# Patient Record
Sex: FEMALE | NOT HISPANIC OR LATINO | Employment: FULL TIME | URBAN - NONMETROPOLITAN AREA
[De-identification: names, ages, dates, MRNs, and addresses within clinical notes are randomized per-mention and may not be internally consistent; named-entity substitution may affect disease eponyms.]

---

## 2017-10-14 ENCOUNTER — HOSPITAL ENCOUNTER (EMERGENCY)
Facility: HOSPITAL | Age: 27
Discharge: HOME | End: 2017-10-14
Attending: EMERGENCY MEDICINE | Admitting: EMERGENCY MEDICINE

## 2017-10-14 VITALS
HEIGHT: 66 IN | DIASTOLIC BLOOD PRESSURE: 88 MMHG | SYSTOLIC BLOOD PRESSURE: 130 MMHG | RESPIRATION RATE: 18 BRPM | BODY MASS INDEX: 33.75 KG/M2 | WEIGHT: 210 LBS | HEART RATE: 67 BPM | OXYGEN SATURATION: 96 % | TEMPERATURE: 97.7 F

## 2017-10-14 DIAGNOSIS — M79.10 PAIN IN THE MUSCLES: Primary | ICD-10-CM

## 2017-10-14 DIAGNOSIS — R11.2 NAUSEA AND VOMITING, INTRACTABILITY OF VOMITING NOT SPECIFIED, UNSPECIFIED VOMITING TYPE: ICD-10-CM

## 2017-10-14 DIAGNOSIS — T67.5XXA HEAT EXHAUSTION, INITIAL ENCOUNTER: ICD-10-CM

## 2017-10-14 LAB
ALBUMIN SERPL-MCNC: 4.7 G/DL (ref 3.5–5)
ALBUMIN/GLOB SERPL: 1.5 G/DL (ref 1–2)
ALP SERPL-CCNC: 79 U/L (ref 38–126)
ALT SERPL W P-5'-P-CCNC: 30 U/L (ref 13–69)
ANION GAP SERPL CALCULATED.3IONS-SCNC: 16.4 MMOL/L
AST SERPL-CCNC: 29 U/L (ref 15–46)
B-HCG UR QL: NEGATIVE
BACTERIA UR QL AUTO: ABNORMAL /HPF
BASOPHILS # BLD AUTO: 0.06 10*3/MM3 (ref 0–0.2)
BASOPHILS NFR BLD AUTO: 0.7 % (ref 0–2.5)
BILIRUB SERPL-MCNC: 0.5 MG/DL (ref 0.2–1.3)
BILIRUB UR QL STRIP: NEGATIVE
BUN BLD-MCNC: 15 MG/DL (ref 7–20)
BUN/CREAT SERPL: 16.7 (ref 7.1–23.5)
CALCIUM SPEC-SCNC: 9.9 MG/DL (ref 8.4–10.2)
CHLORIDE SERPL-SCNC: 107 MMOL/L (ref 98–107)
CK SERPL-CCNC: 218 U/L (ref 30–170)
CLARITY UR: ABNORMAL
CO2 SERPL-SCNC: 25 MMOL/L (ref 26–30)
COLOR UR: YELLOW
CREAT BLD-MCNC: 0.9 MG/DL (ref 0.6–1.3)
CYSTINE CRY URNS QL MICRO: ABNORMAL /HPF
DEPRECATED RDW RBC AUTO: 37.8 FL (ref 37–54)
EOSINOPHIL # BLD AUTO: 0.09 10*3/MM3 (ref 0–0.7)
EOSINOPHIL NFR BLD AUTO: 1.1 % (ref 0–7)
ERYTHROCYTE [DISTWIDTH] IN BLOOD BY AUTOMATED COUNT: 11.7 % (ref 11.5–14.5)
FLUAV AG NPH QL: NEGATIVE
FLUBV AG NPH QL IA: NEGATIVE
GFR SERPL CREATININE-BSD FRML MDRD: 75 ML/MIN/1.73
GLOBULIN UR ELPH-MCNC: 3.2 GM/DL
GLUCOSE BLD-MCNC: 88 MG/DL (ref 74–98)
GLUCOSE UR STRIP-MCNC: NEGATIVE MG/DL
HCT VFR BLD AUTO: 41.9 % (ref 37–47)
HGB BLD-MCNC: 14.7 G/DL (ref 12–16)
HGB UR QL STRIP.AUTO: ABNORMAL
HOLD SPECIMEN: NORMAL
HYALINE CASTS UR QL AUTO: ABNORMAL /LPF
IMM GRANULOCYTES # BLD: 0.02 10*3/MM3 (ref 0–0.06)
IMM GRANULOCYTES NFR BLD: 0.2 % (ref 0–0.6)
KETONES UR QL STRIP: NEGATIVE
LEUKOCYTE ESTERASE UR QL STRIP.AUTO: NEGATIVE
LYMPHOCYTES # BLD AUTO: 2.87 10*3/MM3 (ref 0.6–3.4)
LYMPHOCYTES NFR BLD AUTO: 35.1 % (ref 10–50)
MCH RBC QN AUTO: 31.5 PG (ref 27–31)
MCHC RBC AUTO-ENTMCNC: 35.1 G/DL (ref 30–37)
MCV RBC AUTO: 89.7 FL (ref 81–99)
MONOCYTES # BLD AUTO: 0.75 10*3/MM3 (ref 0–0.9)
MONOCYTES NFR BLD AUTO: 9.2 % (ref 0–12)
MYOGLOBIN SERPL-MCNC: 58.8 NG/ML (ref 0–101)
NEUTROPHILS # BLD AUTO: 4.38 10*3/MM3 (ref 2–6.9)
NEUTROPHILS NFR BLD AUTO: 53.7 % (ref 37–80)
NITRITE UR QL STRIP: NEGATIVE
NRBC BLD MANUAL-RTO: 0 /100 WBC (ref 0–0)
PH UR STRIP.AUTO: 6 [PH] (ref 5–8)
PLATELET # BLD AUTO: 251 10*3/MM3 (ref 130–400)
PMV BLD AUTO: 9.6 FL (ref 6–12)
POTASSIUM BLD-SCNC: 4.4 MMOL/L (ref 3.5–5.1)
PROT SERPL-MCNC: 7.9 G/DL (ref 6.3–8.2)
PROT UR QL STRIP: NEGATIVE
RBC # BLD AUTO: 4.67 10*6/MM3 (ref 4.2–5.4)
RBC # UR: ABNORMAL /HPF
REF LAB TEST METHOD: ABNORMAL
S PYO AG THROAT QL: NEGATIVE
SODIUM BLD-SCNC: 144 MMOL/L (ref 137–145)
SP GR UR STRIP: >=1.03 (ref 1–1.03)
SQUAMOUS #/AREA URNS HPF: ABNORMAL /HPF
UROBILINOGEN UR QL STRIP: ABNORMAL
WBC NRBC COR # BLD: 8.17 10*3/MM3 (ref 4.8–10.8)
WBC UR QL AUTO: ABNORMAL /HPF

## 2017-10-14 PROCEDURE — 87880 STREP A ASSAY W/OPTIC: CPT | Performed by: PHYSICIAN ASSISTANT

## 2017-10-14 PROCEDURE — 87804 INFLUENZA ASSAY W/OPTIC: CPT | Performed by: PHYSICIAN ASSISTANT

## 2017-10-14 PROCEDURE — 96376 TX/PRO/DX INJ SAME DRUG ADON: CPT

## 2017-10-14 PROCEDURE — 87086 URINE CULTURE/COLONY COUNT: CPT | Performed by: PHYSICIAN ASSISTANT

## 2017-10-14 PROCEDURE — 81001 URINALYSIS AUTO W/SCOPE: CPT | Performed by: PHYSICIAN ASSISTANT

## 2017-10-14 PROCEDURE — 25010000002 ONDANSETRON PER 1 MG: Performed by: PHYSICIAN ASSISTANT

## 2017-10-14 PROCEDURE — 82550 ASSAY OF CK (CPK): CPT | Performed by: PHYSICIAN ASSISTANT

## 2017-10-14 PROCEDURE — 25010000002 KETOROLAC TROMETHAMINE PER 15 MG: Performed by: PHYSICIAN ASSISTANT

## 2017-10-14 PROCEDURE — 99283 EMERGENCY DEPT VISIT LOW MDM: CPT

## 2017-10-14 PROCEDURE — 83874 ASSAY OF MYOGLOBIN: CPT | Performed by: PHYSICIAN ASSISTANT

## 2017-10-14 PROCEDURE — 96361 HYDRATE IV INFUSION ADD-ON: CPT

## 2017-10-14 PROCEDURE — 87081 CULTURE SCREEN ONLY: CPT | Performed by: PHYSICIAN ASSISTANT

## 2017-10-14 PROCEDURE — 96375 TX/PRO/DX INJ NEW DRUG ADDON: CPT

## 2017-10-14 PROCEDURE — 85025 COMPLETE CBC W/AUTO DIFF WBC: CPT | Performed by: PHYSICIAN ASSISTANT

## 2017-10-14 PROCEDURE — 96374 THER/PROPH/DIAG INJ IV PUSH: CPT

## 2017-10-14 PROCEDURE — 80053 COMPREHEN METABOLIC PANEL: CPT | Performed by: PHYSICIAN ASSISTANT

## 2017-10-14 PROCEDURE — 81025 URINE PREGNANCY TEST: CPT | Performed by: PHYSICIAN ASSISTANT

## 2017-10-14 RX ORDER — SODIUM CHLORIDE 0.9 % (FLUSH) 0.9 %
10 SYRINGE (ML) INJECTION AS NEEDED
Status: DISCONTINUED | OUTPATIENT
Start: 2017-10-14 | End: 2017-10-15 | Stop reason: HOSPADM

## 2017-10-14 RX ORDER — ONDANSETRON 2 MG/ML
4 INJECTION INTRAMUSCULAR; INTRAVENOUS ONCE
Status: COMPLETED | OUTPATIENT
Start: 2017-10-14 | End: 2017-10-14

## 2017-10-14 RX ORDER — KETOROLAC TROMETHAMINE 30 MG/ML
30 INJECTION, SOLUTION INTRAMUSCULAR; INTRAVENOUS ONCE
Status: COMPLETED | OUTPATIENT
Start: 2017-10-14 | End: 2017-10-14

## 2017-10-14 RX ORDER — ONDANSETRON 4 MG/1
4 TABLET, ORALLY DISINTEGRATING ORAL EVERY 6 HOURS PRN
Qty: 10 TABLET | Refills: 0 | Status: SHIPPED | OUTPATIENT
Start: 2017-10-14 | End: 2018-02-05

## 2017-10-14 RX ORDER — NAPROXEN 500 MG/1
500 TABLET ORAL 2 TIMES DAILY PRN
Qty: 10 TABLET | Refills: 0 | Status: SHIPPED | OUTPATIENT
Start: 2017-10-14 | End: 2018-02-05 | Stop reason: ALTCHOICE

## 2017-10-14 RX ADMIN — ONDANSETRON 4 MG: 2 INJECTION INTRAMUSCULAR; INTRAVENOUS at 21:19

## 2017-10-14 RX ADMIN — KETOROLAC TROMETHAMINE 30 MG: 30 INJECTION, SOLUTION INTRAMUSCULAR at 22:46

## 2017-10-14 RX ADMIN — SODIUM CHLORIDE 1000 ML: 9 INJECTION, SOLUTION INTRAVENOUS at 21:19

## 2017-10-14 RX ADMIN — ONDANSETRON 4 MG: 2 INJECTION INTRAMUSCULAR; INTRAVENOUS at 22:45

## 2017-10-15 NOTE — ED PROVIDER NOTES
Subjective   HPI Comments: 27-year-old female just returned from a camping trip.  She was in the heat for quite some time.  Denies drinking [place] water or creek water.  She is here with a one-day history of neck and back muscle cramps that are fairly severe associated with dizziness, vomiting ×2 numbness and tingling of her face.  Also states she is somewhat disoriented and confused at times.  No URI symptoms, sore throat, earache, cough, diarrhea, urinary complaint or fever.  No abdominal pain.  LMP 6 weeks ago.  Denies pregnancy.  She is always irregular.  She denies overexertion or working out recently.  No tick bites or other type of insect-type bite.  No chest pain.    Aggravating factors: None.  Alleviating factors: None.  Treatment prior to arrival: None.      History provided by:  Patient  History limited by: nothing.   used: No        Review of Systems   Constitutional: Negative.    HENT: Negative.    Eyes: Negative.    Respiratory: Negative.    Cardiovascular: Negative.  Negative for chest pain.   Gastrointestinal: Positive for nausea and vomiting. Negative for abdominal pain.   Endocrine: Negative.    Genitourinary: Negative for dysuria.   Musculoskeletal: Positive for back pain and neck pain.   Skin: Negative.    Allergic/Immunologic: Negative.    Neurological: Positive for dizziness, light-headedness and numbness.   Hematological: Negative.    Psychiatric/Behavioral: Negative.    All other systems reviewed and are negative.      History reviewed. No pertinent past medical history.    No Known Allergies    Past Surgical History:   Procedure Laterality Date   • CHOLECYSTECTOMY     • KNEE SURGERY         History reviewed. No pertinent family history.    Social History     Social History   • Marital status: Single     Spouse name: N/A   • Number of children: N/A   • Years of education: N/A     Social History Main Topics   • Smoking status: Never Smoker   • Smokeless tobacco: None   •  Alcohol use Yes      Comment: occ   • Drug use: No   • Sexual activity: Not Asked     Other Topics Concern   • None     Social History Narrative   • None           Objective   Physical Exam   Constitutional: She is oriented to person, place, and time. She appears well-developed and well-nourished. No distress.   HENT:   Head: Normocephalic and atraumatic.   Right Ear: External ear normal.   Left Ear: External ear normal.   Tympanic membranes and pharynx are normal.   Eyes: EOM are normal. Pupils are equal, round, and reactive to light.   Neck: Normal range of motion. Neck supple.   Cardiovascular: Normal rate, regular rhythm and normal heart sounds.    Pulmonary/Chest: Effort normal and breath sounds normal. No stridor. She has no wheezes. She exhibits no tenderness.   Abdominal: Soft. She exhibits no distension. There is no tenderness. There is no rebound and no guarding.   Musculoskeletal: Normal range of motion. She exhibits no edema.   Neurological: She is alert and oriented to person, place, and time.   Skin: Skin is warm and dry. No rash noted. She is not diaphoretic.   Psychiatric: She has a normal mood and affect. Her behavior is normal. Judgment and thought content normal.   Nursing note and vitals reviewed.      Procedures         ED Course  ED Course   Comment By Time   Patient still with nausea.  Muscle aches are much better.  Laboratory evaluation noted above.  Mild elevation of her CK.  Story suggests heat exhaustion with minimal muscle breakdown.  I have explained to her to stay out of the sun for 2 days, drink lots of fluids and we will treat for muscle aches and nausea. Jakob Younger PA-C 10/14 2214                  [facility]  Number of Diagnoses or Management Options  Heat exhaustion, initial encounter: new and requires workup  Nausea and vomiting, intractability of vomiting not specified, unspecified vomiting type: new and requires workup  Pain in the muscles: new and requires workup     Amount and/or  Complexity of Data Reviewed  Clinical lab tests: reviewed and ordered  Discuss the patient with other providers: yes    Risk of Complications, Morbidity, and/or Mortality  Presenting problems: moderate  Diagnostic procedures: low  Management options: moderate    Patient Progress  Patient progress: stable      Final diagnoses:   Pain in the muscles   Heat exhaustion, initial encounter   Nausea and vomiting, intractability of vomiting not specified, unspecified vomiting type            Jakob Younger PA-C  10/14/17 1995

## 2017-10-15 NOTE — DISCHARGE INSTRUCTIONS
Stay out of the sun for at least 2 days.  Increase fluids at home.  Keep on drinking fluids until you urinate.  Return to the ER for severe abdominal pain, intractable vomiting, chest pain, shortness of breath, new or worsening symptoms.  Follow-up with your doctor in 2 days.  If you do not have a doctor, follow-up with the [facility].  Call for appointment.

## 2017-10-16 LAB
BACTERIA SPEC AEROBE CULT: NORMAL
BACTERIA SPEC AEROBE CULT: NORMAL

## 2018-02-05 ENCOUNTER — OFFICE VISIT (OUTPATIENT)
Dept: RETAIL CLINIC | Facility: CLINIC | Age: 28
End: 2018-02-05

## 2018-02-05 VITALS
OXYGEN SATURATION: 98 % | SYSTOLIC BLOOD PRESSURE: 130 MMHG | DIASTOLIC BLOOD PRESSURE: 80 MMHG | RESPIRATION RATE: 18 BRPM | HEART RATE: 68 BPM | TEMPERATURE: 97.8 F

## 2018-02-05 DIAGNOSIS — H10.31 ACUTE CONJUNCTIVITIS OF RIGHT EYE, UNSPECIFIED ACUTE CONJUNCTIVITIS TYPE: Primary | ICD-10-CM

## 2018-02-05 PROCEDURE — 99213 OFFICE O/P EST LOW 20 MIN: CPT | Performed by: NURSE PRACTITIONER

## 2018-02-05 RX ORDER — POLYMYXIN B SULFATE AND TRIMETHOPRIM 1; 10000 MG/ML; [USP'U]/ML
1 SOLUTION OPHTHALMIC EVERY 6 HOURS
Qty: 10 ML | Refills: 0 | Status: SHIPPED | OUTPATIENT
Start: 2018-02-05 | End: 2018-02-12

## 2018-02-05 RX ORDER — ACETAMINOPHEN 325 MG/1
650 TABLET ORAL EVERY 6 HOURS PRN
COMMUNITY
End: 2018-04-27

## 2018-02-05 NOTE — PATIENT INSTRUCTIONS
Allergic Conjunctivitis  A clear membrane (conjunctiva) covers the white part of your eye and the inner surface of your eyelid. Allergic conjunctivitis happens when this membrane has inflammation. This is caused by allergies. Common causes of allergic reactions (allergens)include:  · Outdoor allergens, such as:  ¨ Pollen.  ¨ Grass and weeds.  ¨ Mold spores.  · Indoor allergens, such as:  ¨ Dust.  ¨ Smoke.  ¨ Mold.  ¨ Pet dander.  ¨ Animal hair.  This condition can make your eye red or pink. It can also make your eye feel itchy. This condition cannot be spread from one person to another person (is not contagious).  Follow these instructions at home:  · Try not to be around things that you are allergic to.  · Take or apply over-the-counter and prescription medicines only as told by your doctor. These include any eye drops.  · Place a cool, clean washcloth on your eye for 10-20 minutes. Do this 3-4 times a day.  · Do not touch or rub your eyes.  · Do not wear contact lenses until the inflammation is gone. Wear glasses instead.  · Do not wear eye makeup until the inflammation is gone.  · Keep all follow-up visits as told by your doctor. This is important.  Contact a doctor if:  · Your symptoms get worse.  · Your symptoms do not get better with treatment.  · You have mild eye pain.  · You are sensitive to light,  · You have spots or blisters on your eyes.  · You have pus coming from your eye.  · You have a fever.  Get help right away if:  · You have redness, swelling, or other symptoms in only one eye.  · Your vision is blurry.  · You have vision changes.  · You have very bad eye pain.  Summary  · Allergic conjunctivitis is caused by allergies. It can make your eye red or pink, and it can make your eye feel itchy.  · This condition cannot be spread from one person to another person (is not contagious).  · Try not to be around things that you are allergic to.  · Take or apply over-the-counter and prescription medicines  only as told by your doctor. These include any eye drops.  · Contact your doctor if your symptoms get worse or they do not get better with treatment.  This information is not intended to replace advice given to you by your health care provider. Make sure you discuss any questions you have with your health care provider.  Document Released: 06/07/2011 Document Revised: 08/11/2017 Document Reviewed: 08/11/2017  [employer] Patient Education © 2017 [employer]    Eye drops as prescribed.  Strict hand hygiene.  Follow up with eye doctor if no improvement or worsening.

## 2018-02-05 NOTE — PROGRESS NOTES
Subjective   Patient ID: Arelis Taylor is a 27 y.o. female presents with   Chief Complaint   Patient presents with   • Conjunctivitis     Red eye       Conjunctivitis    The current episode started today. The problem has been gradually worsening. The problem is moderate. Nothing relieves the symptoms. Nothing aggravates the symptoms. Associated symptoms include eye itching, congestion, eye discharge and eye redness. Pertinent negatives include no fever, no decreased vision, no double vision, no photophobia, no ear pain, no headaches, no rhinorrhea, no sore throat, no swollen glands, no muscle aches, no neck stiffness, no cough, no wheezing, no rash and no eye pain (burning sensation).       No Known Allergies    The following portions of the patient's history were reviewed and updated as appropriate: allergies, current medications, past family history, past medical history, past social history, past surgical history and problem list.      Review of Systems   Constitutional: Negative for fever.   HENT: Positive for congestion. Negative for ear pain, rhinorrhea, sinus pressure, sneezing and sore throat.    Eyes: Positive for discharge, redness and itching. Negative for double vision, photophobia, pain (burning sensation) and visual disturbance.   Respiratory: Negative for cough, shortness of breath and wheezing.    Skin: Negative for rash.   Neurological: Negative for headaches.       Objective     Vitals:    02/05/18 1108   BP: 130/80   Pulse: 68   Resp: 18   Temp: 97.8 °F (36.6 °C)   SpO2: 98%         Physical Exam   Constitutional: She is oriented to person, place, and time. She appears well-developed and well-nourished. She does not appear ill. No distress.   HENT:   Head: Normocephalic.   Right Ear: Hearing, tympanic membrane, external ear and ear canal normal.   Left Ear: Hearing, tympanic membrane, external ear and ear canal normal.   Nose: Nose normal. No rhinorrhea or sinus tenderness.    Mouth/Throat: Oropharynx is clear and moist and mucous membranes are normal. Tonsils are 2+ on the right. Tonsils are 2+ on the left. No tonsillar exudate.   Eyes: EOM and lids are normal. Pupils are equal, round, and reactive to light. Right conjunctiva is injected.   Sclera white.   Neck: No tracheal deviation present.   Cardiovascular: Normal rate, regular rhythm, S1 normal, S2 normal and normal heart sounds.    Pulmonary/Chest: Effort normal and breath sounds normal. No accessory muscle usage. No respiratory distress.   Lymphadenopathy:     She has no cervical adenopathy.   Neurological: She is alert and oriented to person, place, and time.   Skin: Skin is warm and dry.   Vitals reviewed.        Arelis was seen today for conjunctivitis.    Diagnoses and all orders for this visit:    Acute conjunctivitis of right eye, unspecified acute conjunctivitis type  -     trimethoprim-polymyxin b (POLYTRIM) 96552-5.1 UNIT/ML-% ophthalmic solution; Administer 1 drop to the right eye Every 6 (Six) Hours for 7 days.      Eye drops as prescribed.  Strict hand hygiene.  Follow up with eye doctor if no improvement or worsening.  Follow-up with Primary Care Physician in 48-72 hours if these symptoms worsen or fail to improve as anticipated. Patient verbalizes understanding.

## 2018-04-27 ENCOUNTER — OFFICE VISIT (OUTPATIENT)
Dept: SURGERY | Facility: CLINIC | Age: 28
End: 2018-04-27

## 2018-04-27 VITALS
WEIGHT: 205.4 LBS | TEMPERATURE: 98 F | OXYGEN SATURATION: 98 % | BODY MASS INDEX: 33.01 KG/M2 | HEART RATE: 86 BPM | SYSTOLIC BLOOD PRESSURE: 120 MMHG | HEIGHT: 66 IN | DIASTOLIC BLOOD PRESSURE: 82 MMHG

## 2018-04-27 DIAGNOSIS — K59.00 CONSTIPATION, UNSPECIFIED CONSTIPATION TYPE: ICD-10-CM

## 2018-04-27 DIAGNOSIS — R10.13 EPIGASTRIC ABDOMINAL PAIN: Primary | ICD-10-CM

## 2018-04-27 PROCEDURE — 99244 OFF/OP CNSLTJ NEW/EST MOD 40: CPT | Performed by: SURGERY

## 2018-04-27 RX ORDER — ONDANSETRON 4 MG/1
4 TABLET, FILM COATED ORAL EVERY 8 HOURS PRN
COMMUNITY

## 2018-04-27 RX ORDER — OMEPRAZOLE 20 MG/1
20 CAPSULE, DELAYED RELEASE ORAL DAILY
COMMUNITY
End: 2018-05-25 | Stop reason: ALTCHOICE

## 2018-04-27 NOTE — PROGRESS NOTES
Patient: Arelis Taylor    YOB: 1990    Date: 04/27/2018    Primary Care Provider: DONALD Liu    Reason for Consultation: EGD    Chief Complaint   Patient presents with   • Abdominal Pain       Subjective .     History of present illness:  I saw the patient in the office today as a consultation for evaluation and treatment of abdominal pain. She complains of constant epigastric ache, turns sharp after meals, does not radiate for the past 3 months. She states prior to taking omeprazole then pain was severe.The Prilosec has helped somewhat certainly has relieved any GERD that she has had.  She complains of associated nausea and constipation. She denies vomiting or diarrhea. She has had a cholecystectomy in 2014.    The following portions of the patient's history were reviewed and updated as appropriate: allergies, current medications, past family history, past medical history, past social history, past surgical history and problem list.      Review of Systems   Constitutional: Negative for chills, fever and unexpected weight change.   HENT: Negative for hearing loss, trouble swallowing and voice change.    Eyes: Negative for visual disturbance.   Respiratory: Negative for apnea, cough, chest tightness, shortness of breath and wheezing.    Cardiovascular: Negative for chest pain, palpitations and leg swelling.   Gastrointestinal: Positive for abdominal pain, diarrhea and nausea. Negative for abdominal distention, anal bleeding, blood in stool, constipation, rectal pain and vomiting.   Endocrine: Negative for cold intolerance and heat intolerance.   Genitourinary: Negative for difficulty urinating, dysuria and flank pain.   Musculoskeletal: Negative for back pain and gait problem.   Skin: Negative for color change, rash and wound.   Neurological: Negative for dizziness, syncope, speech difficulty, weakness, light-headedness, numbness and headaches.   Hematological: Negative for  adenopathy. Does not bruise/bleed easily.   Psychiatric/Behavioral: Negative for confusion. The patient is not nervous/anxious.        History:  History reviewed. No pertinent past medical history.    Past Surgical History:   Procedure Laterality Date   • CHOLECYSTECTOMY     • KNEE ARTHROSCOPY W/ ACL RECONSTRUCTION     • KNEE ARTHROSCOPY W/ PARTIAL MEDIAL MENISCECTOMY Right 2017   • KNEE SURGERY         Family History   Problem Relation Age of Onset   • Cancer Mother    • Gout Father    • Diabetes Father    • Hypertension Father        Social History   Substance Use Topics   • Smoking status: Never Smoker   • Smokeless tobacco: Never Used   • Alcohol use Yes      Comment: occ       Medications:    Current Outpatient Prescriptions:   •  omeprazole (priLOSEC) 20 MG capsule, Take 20 mg by mouth Daily., Disp: , Rfl:   •  ondansetron (ZOFRAN) 4 MG tablet, Take 4 mg by mouth Every 8 (Eight) Hours As Needed for Nausea or Vomiting., Disp: , Rfl:      Allergies:  No Known Allergies    Objective     Vital Signs:   Temp:  [98 °F (36.7 °C)] 98 °F (36.7 °C)  Heart Rate:  [86] 86  BP: (120)/(82) 120/82    Physical Exam:   General Appearance:    Alert, cooperative, in no acute distress   Head:    Normocephalic, without obvious abnormality, atraumatic   Eyes:            Lids and lashes normal, conjunctivae and sclerae normal, no   icterus, no pallor, corneas clear, PERRL   Ears:    Ears appear intact with no abnormalities noted   Lungs/respiratory:     Clear to auscultation,respirations regular, even and                  unlabored    Heart/cardiovascular:    Regular rhythm and normal rate, normal S1 and S2, no            murmur   Abdomen:     no masses, no organomegaly, soft non-distended, no guarding.  Minimal if any tenderness in the epigastrium    Extremities:   Moves all extremities well, no edema, no cyanosis, no             redness   Skin:   No bleeding, bruising or rash   Neurologic:   Cranial nerves 2 - 12 grossly intact,  sensation intact   Psychiatric: No evidence of depression or anxiety      Results Review:   None    Review of Systems was reviewed and confirmed as accurate today.    Assessment/Plan :    1. Epigastric abdominal pain    2. Constipation, unspecified constipation type        I recommend a EGD for further evaluation.  The procedure as well as the risks were explained which include but are not limited to bleeding, infection, intestinal perforation, Aspiration etc. were explained and the patient understood all of the above and wishes to proceed with an EGD.Continue the EPI.  I also recommend a fiber supplement of choice such as Benefiber for her relative constipation.  I will order some labs as well.    Electronically signed by Parmjit Trinh MD  04/27/18  9:15 AM  Scribed for Parmjit Trinh MD by Jovita Anguiano. 4/27/2018  10:02 AM

## 2018-04-30 PROBLEM — R10.13 EPIGASTRIC ABDOMINAL PAIN: Status: ACTIVE | Noted: 2018-04-30

## 2018-05-02 ENCOUNTER — RESULTS ENCOUNTER (OUTPATIENT)
Dept: SURGERY | Facility: CLINIC | Age: 28
End: 2018-05-02

## 2018-05-02 DIAGNOSIS — R10.13 EPIGASTRIC ABDOMINAL PAIN: ICD-10-CM

## 2018-05-04 ENCOUNTER — ANESTHESIA (OUTPATIENT)
Dept: GASTROENTEROLOGY | Facility: HOSPITAL | Age: 28
End: 2018-05-04

## 2018-05-04 ENCOUNTER — ANESTHESIA EVENT (OUTPATIENT)
Dept: GASTROENTEROLOGY | Facility: HOSPITAL | Age: 28
End: 2018-05-04

## 2018-05-04 ENCOUNTER — HOSPITAL ENCOUNTER (OUTPATIENT)
Facility: HOSPITAL | Age: 28
Setting detail: HOSPITAL OUTPATIENT SURGERY
Discharge: HOME | End: 2018-05-04
Attending: SURGERY | Admitting: SURGERY

## 2018-05-04 VITALS
OXYGEN SATURATION: 100 % | DIASTOLIC BLOOD PRESSURE: 77 MMHG | HEIGHT: 66 IN | WEIGHT: 196 LBS | SYSTOLIC BLOOD PRESSURE: 118 MMHG | HEART RATE: 62 BPM | BODY MASS INDEX: 31.5 KG/M2 | RESPIRATION RATE: 20 BRPM | TEMPERATURE: 96.9 F

## 2018-05-04 DIAGNOSIS — R10.13 EPIGASTRIC ABDOMINAL PAIN: ICD-10-CM

## 2018-05-04 LAB
ALBUMIN SERPL-MCNC: 4.7 G/DL (ref 3.5–5)
ALBUMIN/GLOB SERPL: 1.5 G/DL (ref 1–2)
ALP SERPL-CCNC: 71 U/L (ref 38–126)
ALT SERPL W P-5'-P-CCNC: 34 U/L (ref 13–69)
ANION GAP SERPL CALCULATED.3IONS-SCNC: 15.7 MMOL/L (ref 10–20)
AST SERPL-CCNC: 36 U/L (ref 15–46)
B-HCG UR QL: NEGATIVE
BASOPHILS # BLD AUTO: 0.04 10*3/MM3 (ref 0–0.2)
BASOPHILS NFR BLD AUTO: 0.6 % (ref 0–2.5)
BILIRUB SERPL-MCNC: 0.6 MG/DL (ref 0.2–1.3)
BUN BLD-MCNC: 18 MG/DL (ref 7–20)
BUN/CREAT SERPL: 20 (ref 7.1–23.5)
CALCIUM SPEC-SCNC: 9.2 MG/DL (ref 8.4–10.2)
CHLORIDE SERPL-SCNC: 102 MMOL/L (ref 98–107)
CO2 SERPL-SCNC: 30 MMOL/L (ref 26–30)
CREAT BLD-MCNC: 0.9 MG/DL (ref 0.6–1.3)
DEPRECATED RDW RBC AUTO: 38.5 FL (ref 37–54)
EOSINOPHIL # BLD AUTO: 0.05 10*3/MM3 (ref 0–0.7)
EOSINOPHIL NFR BLD AUTO: 0.8 % (ref 0–7)
ERYTHROCYTE [DISTWIDTH] IN BLOOD BY AUTOMATED COUNT: 11.6 % (ref 11.5–14.5)
GFR SERPL CREATININE-BSD FRML MDRD: 75 ML/MIN/1.73
GLOBULIN UR ELPH-MCNC: 3.1 GM/DL
GLUCOSE BLD-MCNC: 87 MG/DL (ref 74–98)
HCT VFR BLD AUTO: 42.4 % (ref 37–47)
HGB BLD-MCNC: 15 G/DL (ref 12–16)
IMM GRANULOCYTES # BLD: 0.03 10*3/MM3 (ref 0–0.06)
IMM GRANULOCYTES NFR BLD: 0.5 % (ref 0–0.6)
INTERNAL NEGATIVE CONTROL: NEGATIVE
INTERNAL POSITIVE CONTROL: POSITIVE
LIPASE SERPL-CCNC: 67 U/L (ref 23–300)
LYMPHOCYTES # BLD AUTO: 2.23 10*3/MM3 (ref 0.6–3.4)
LYMPHOCYTES NFR BLD AUTO: 34.8 % (ref 10–50)
Lab: NORMAL
MCH RBC QN AUTO: 32.2 PG (ref 27–31)
MCHC RBC AUTO-ENTMCNC: 35.4 G/DL (ref 30–37)
MCV RBC AUTO: 91 FL (ref 81–99)
MONOCYTES # BLD AUTO: 0.5 10*3/MM3 (ref 0–0.9)
MONOCYTES NFR BLD AUTO: 7.8 % (ref 0–12)
NEUTROPHILS # BLD AUTO: 3.55 10*3/MM3 (ref 2–6.9)
NEUTROPHILS NFR BLD AUTO: 55.5 % (ref 37–80)
NRBC BLD MANUAL-RTO: 0 /100 WBC (ref 0–0)
PLATELET # BLD AUTO: 248 10*3/MM3 (ref 130–400)
PMV BLD AUTO: 9.3 FL (ref 6–12)
POTASSIUM BLD-SCNC: 3.7 MMOL/L (ref 3.5–5.1)
PROT SERPL-MCNC: 7.8 G/DL (ref 6.3–8.2)
RBC # BLD AUTO: 4.66 10*6/MM3 (ref 4.2–5.4)
SODIUM BLD-SCNC: 144 MMOL/L (ref 137–145)
WBC NRBC COR # BLD: 6.4 10*3/MM3 (ref 4.8–10.8)

## 2018-05-04 PROCEDURE — 83690 ASSAY OF LIPASE: CPT | Performed by: SURGERY

## 2018-05-04 PROCEDURE — 85025 COMPLETE CBC W/AUTO DIFF WBC: CPT | Performed by: SURGERY

## 2018-05-04 PROCEDURE — 25010000002 PROPOFOL 200 MG/20ML EMULSION: Performed by: NURSE ANESTHETIST, CERTIFIED REGISTERED

## 2018-05-04 PROCEDURE — 25010000002 ONDANSETRON PER 1 MG: Performed by: NURSE ANESTHETIST, CERTIFIED REGISTERED

## 2018-05-04 PROCEDURE — 25010000002 MIDAZOLAM PER 1 MG: Performed by: NURSE ANESTHETIST, CERTIFIED REGISTERED

## 2018-05-04 PROCEDURE — 80053 COMPREHEN METABOLIC PANEL: CPT | Performed by: SURGERY

## 2018-05-04 RX ORDER — SODIUM CHLORIDE, SODIUM LACTATE, POTASSIUM CHLORIDE, CALCIUM CHLORIDE 600; 310; 30; 20 MG/100ML; MG/100ML; MG/100ML; MG/100ML
1000 INJECTION, SOLUTION INTRAVENOUS CONTINUOUS
Status: DISCONTINUED | OUTPATIENT
Start: 2018-05-04 | End: 2018-05-04 | Stop reason: HOSPADM

## 2018-05-04 RX ORDER — PROPOFOL 10 MG/ML
INJECTION, EMULSION INTRAVENOUS AS NEEDED
Status: DISCONTINUED | OUTPATIENT
Start: 2018-05-04 | End: 2018-05-04 | Stop reason: SURG

## 2018-05-04 RX ORDER — SODIUM CHLORIDE 0.9 % (FLUSH) 0.9 %
3 SYRINGE (ML) INJECTION AS NEEDED
Status: DISCONTINUED | OUTPATIENT
Start: 2018-05-04 | End: 2018-05-04 | Stop reason: HOSPADM

## 2018-05-04 RX ORDER — ONDANSETRON 2 MG/ML
INJECTION INTRAMUSCULAR; INTRAVENOUS AS NEEDED
Status: DISCONTINUED | OUTPATIENT
Start: 2018-05-04 | End: 2018-05-04 | Stop reason: SURG

## 2018-05-04 RX ORDER — ONDANSETRON 2 MG/ML
4 INJECTION INTRAMUSCULAR; INTRAVENOUS ONCE AS NEEDED
Status: DISCONTINUED | OUTPATIENT
Start: 2018-05-04 | End: 2018-05-04 | Stop reason: HOSPADM

## 2018-05-04 RX ORDER — MIDAZOLAM HYDROCHLORIDE 1 MG/ML
INJECTION INTRAMUSCULAR; INTRAVENOUS AS NEEDED
Status: DISCONTINUED | OUTPATIENT
Start: 2018-05-04 | End: 2018-05-04 | Stop reason: SURG

## 2018-05-04 RX ADMIN — SODIUM CHLORIDE, POTASSIUM CHLORIDE, SODIUM LACTATE AND CALCIUM CHLORIDE 1000 ML: 600; 310; 30; 20 INJECTION, SOLUTION INTRAVENOUS at 11:36

## 2018-05-04 RX ADMIN — MIDAZOLAM HYDROCHLORIDE 2 MG: 1 INJECTION, SOLUTION INTRAMUSCULAR; INTRAVENOUS at 13:12

## 2018-05-04 RX ADMIN — ONDANSETRON 4 MG: 2 INJECTION INTRAMUSCULAR; INTRAVENOUS at 13:11

## 2018-05-04 RX ADMIN — LIDOCAINE HYDROCHLORIDE 60 MG: 20 INJECTION, SOLUTION INTRAVENOUS at 13:14

## 2018-05-04 RX ADMIN — PROPOFOL 50 MG: 10 INJECTION, EMULSION INTRAVENOUS at 13:18

## 2018-05-04 RX ADMIN — PROPOFOL 100 MG: 10 INJECTION, EMULSION INTRAVENOUS at 13:14

## 2018-05-04 NOTE — ANESTHESIA PREPROCEDURE EVALUATION
Anesthesia Evaluation     Patient summary reviewed and Nursing notes reviewed   no history of anesthetic complications:  NPO Solid Status: > 8 hours  NPO Liquid Status: > 8 hours           Airway   Mallampati: II  TM distance: >3 FB  Neck ROM: full  No difficulty expected  Dental - normal exam     Pulmonary - negative pulmonary ROS and normal exam   (-) not a smoker  Cardiovascular - negative cardio ROS and normal exam  Exercise tolerance: good (4-7 METS)        Neuro/Psych- negative ROS  GI/Hepatic/Renal/Endo    (+) obesity,  GERD poorly controlled,      Musculoskeletal (-) negative ROS    Abdominal    Substance History - negative use     OB/GYN negative ob/gyn ROS     Comment: Urine pregnancy negative      Other - negative ROS                       Anesthesia Plan    ASA 2     MAC   (Risks and benefits discussed including risk of aspiration, recall and dental damage. All patient questions answered. Will continue with POC.)  intravenous induction   Anesthetic plan and risks discussed with patient.

## 2018-05-04 NOTE — ANESTHESIA POSTPROCEDURE EVALUATION
Patient: Arelis Taylor    Procedure Summary     Date:  05/04/18 Room / Location:  [facility] ENDOSCOPY 3 / [facility]COPY    Anesthesia Start:  1306 Anesthesia Stop:  1326    Procedure:  ESOPHAGOGASTRODUODENOSCOPY (N/A Esophagus) Diagnosis:       Epigastric abdominal pain      (Epigastric abdominal pain [R10.13])    Surgeon:  Parmjit Trinh MD Provider:  Lilian Lewis CRNA    Anesthesia Type:  MAC ASA Status:  2          Anesthesia Type: MAC  Last vitals  BP   113/66 (05/04/18 1330)   Temp   96.9 °F (36.1 °C) (05/04/18 1330)   Pulse   75 (05/04/18 1330)   Resp   20 (05/04/18 1330)     SpO2   99 % (05/04/18 1330)     Post Anesthesia Care and Evaluation    Patient location during evaluation: PHASE II  Patient participation: complete - patient participated  Level of consciousness: awake and alert  Pain score: 0  Pain management: satisfactory to patient  Airway patency: patent  Anesthetic complications: No anesthetic complications  PONV Status: none  Cardiovascular status: acceptable and stable  Respiratory status: acceptable  Hydration status: acceptable

## 2018-05-10 LAB
LAB AP CASE REPORT: NORMAL
Lab: NORMAL
PATH REPORT.FINAL DX SPEC: NORMAL

## 2018-05-25 ENCOUNTER — OFFICE VISIT (OUTPATIENT)
Dept: SURGERY | Facility: CLINIC | Age: 28
End: 2018-05-25

## 2018-05-25 VITALS
TEMPERATURE: 98.8 F | HEART RATE: 68 BPM | HEIGHT: 66 IN | WEIGHT: 196 LBS | DIASTOLIC BLOOD PRESSURE: 70 MMHG | OXYGEN SATURATION: 99 % | SYSTOLIC BLOOD PRESSURE: 120 MMHG | BODY MASS INDEX: 31.5 KG/M2

## 2018-05-25 DIAGNOSIS — R10.13 EPIGASTRIC PAIN: ICD-10-CM

## 2018-05-25 DIAGNOSIS — R19.5 LOOSE STOOLS: ICD-10-CM

## 2018-05-25 DIAGNOSIS — K21.9 GASTROESOPHAGEAL REFLUX DISEASE WITHOUT ESOPHAGITIS: Primary | ICD-10-CM

## 2018-05-25 DIAGNOSIS — K59.00 CONSTIPATION, UNSPECIFIED CONSTIPATION TYPE: ICD-10-CM

## 2018-05-25 PROCEDURE — 99213 OFFICE O/P EST LOW 20 MIN: CPT | Performed by: SURGERY

## 2018-05-25 RX ORDER — DICYCLOMINE HCL 20 MG
20 TABLET ORAL 3 TIMES DAILY PRN
Qty: 30 TABLET | Refills: 0 | Status: SHIPPED | OUTPATIENT
Start: 2018-05-25

## 2018-05-25 RX ORDER — RANITIDINE 150 MG/1
150 TABLET ORAL 2 TIMES DAILY
Qty: 60 TABLET | Refills: 1 | Status: SHIPPED | OUTPATIENT
Start: 2018-05-25

## 2018-05-25 NOTE — PROGRESS NOTES
"Patient: Arelis Taylor    YOB: 1990    Date: 05/25/2018    Primary Care Provider: DONALD Liu    Reason for Consultation: Follow-up EGD    Chief Complaint   Patient presents with   • Follow-up     follow up epigastric pain       History of present illness:  I saw the patient in the office today as a followup from their recent EGD with biopsy, the pathology report did show benign duodenal mucosa, mild chronic gastritis and no bacteria. Patient still has once a day or every other day pain in the right upper abdomen that radiates into the right lower abdomen.  Can be sharp.  No obvious triggering factors.  She has been on the prosthetic and has helped some of her GERD symptoms that she's had the past.  No current issues regarding that.    The following portions of the patient's history were reviewed and updated as appropriate: allergies, current medications, past family history, past medical history, past social history, past surgical history and problem list.      Review of Systems    Vital Signs:   Vitals:    05/25/18 0908   BP: 120/70   Pulse: 68   Temp: 98.8 °F (37.1 °C)   TempSrc: Temporal Artery    SpO2: 99%   Weight: 88.9 kg (196 lb)   Height: 167.6 cm (66\")       Allergies:  No Known Allergies    Medications:    Current Outpatient Prescriptions:   •  omeprazole (priLOSEC) 20 MG capsule, Take 20 mg by mouth Daily., Disp: , Rfl:   •  ondansetron (ZOFRAN) 4 MG tablet, Take 4 mg by mouth Every 8 (Eight) Hours As Needed for Nausea or Vomiting., Disp: , Rfl:     Physical Exam:   General Appearance:    Alert, cooperative, in no acute distress   Abdomen:     Soft nontender nondistended.  Rather benign abdominal exam             Cor:  Regular rate and rhythm      Results Review:   I reviewed the patient's new clinical results.    Assessment / Plan:    1. Gastroesophageal reflux disease without esophagitis    2. Epigastric pain    3. Constipation, unspecified constipation type    4. " Loose stools      Uncertain as to the etiology of her symptoms and EGD was normal.  She's not had any weight loss.  May be hepatic flexure syndrome due to constipation which has improved somewhat still has some constipation issues.  She is only taking the Benefiber once a day and I like her to do this 2 or 3 times a day.  I will change her to Zantac from the Prilosec and she can even consider taking that as needed.  I've also discussed with her a trial of Bentyl.  I don't think a CT scan is warranted at this time since I believe this will be of low yield.  Follow-up in 1 month    Electronically signed by Parmjit Trinh MD  05/25/18        Scribed for Parmjit Trinh MD by Cici Muhammad. 5/25/2018  9:40 AM

## 2018-11-19 ENCOUNTER — HOSPITAL ENCOUNTER (OUTPATIENT)
Facility: HOSPITAL | Age: 28
Discharge: HOME | End: 2018-11-19
Payer: COMMERCIAL

## 2018-11-19 LAB
A/G RATIO: 1.7 (ref 0.8–2)
ALBUMIN SERPL-MCNC: 4.5 G/DL (ref 3.4–4.8)
ALP BLD-CCNC: 78 U/L (ref 25–100)
ALT SERPL-CCNC: 15 U/L (ref 4–36)
ANION GAP SERPL CALCULATED.3IONS-SCNC: 12 MMOL/L (ref 3–16)
AST SERPL-CCNC: 19 U/L (ref 8–33)
BASOPHILS ABSOLUTE: 0 K/UL (ref 0–0.1)
BASOPHILS RELATIVE PERCENT: 0.4 %
BILIRUB SERPL-MCNC: 0.7 MG/DL (ref 0.3–1.2)
BUN BLDV-MCNC: 13 MG/DL (ref 6–20)
CALCIUM SERPL-MCNC: 9 MG/DL (ref 8.5–10.5)
CHLORIDE BLD-SCNC: 102 MMOL/L (ref 98–107)
CO2: 26 MMOL/L (ref 20–30)
CREAT SERPL-MCNC: 0.9 MG/DL (ref 0.4–1.2)
EOSINOPHILS ABSOLUTE: 0 K/UL (ref 0–0.4)
EOSINOPHILS RELATIVE PERCENT: 0.4 %
GFR AFRICAN AMERICAN: >59
GFR NON-AFRICAN AMERICAN: >60
GLOBULIN: 2.6 G/DL
GLUCOSE BLD-MCNC: 100 MG/DL (ref 74–106)
HCT VFR BLD CALC: 43.6 % (ref 37–47)
HEMOGLOBIN: 14.9 G/DL (ref 11.5–16.5)
IMMATURE GRANULOCYTES #: 0 K/UL
IMMATURE GRANULOCYTES %: 0.6 % (ref 0–5)
LYMPHOCYTES ABSOLUTE: 0.9 K/UL (ref 1.5–4)
LYMPHOCYTES RELATIVE PERCENT: 19.8 %
MCH RBC QN AUTO: 31.6 PG (ref 27–32)
MCHC RBC AUTO-ENTMCNC: 34.2 G/DL (ref 31–35)
MCV RBC AUTO: 92.4 FL (ref 80–100)
MONOCYTES ABSOLUTE: 0.6 K/UL (ref 0.2–0.8)
MONOCYTES RELATIVE PERCENT: 13 %
NEUTROPHILS ABSOLUTE: 3.1 K/UL (ref 2–7.5)
NEUTROPHILS RELATIVE PERCENT: 65.8 %
PDW BLD-RTO: 11.4 % (ref 11–16)
PLATELET # BLD: 228 K/UL (ref 150–400)
PMV BLD AUTO: 9.1 FL (ref 6–10)
POTASSIUM SERPL-SCNC: 4.1 MMOL/L (ref 3.4–5.1)
RBC # BLD: 4.72 M/UL (ref 3.8–5.8)
SODIUM BLD-SCNC: 140 MMOL/L (ref 136–145)
TOTAL PROTEIN: 7.1 G/DL (ref 6.4–8.3)
WBC # BLD: 4.7 K/UL (ref 4–11)

## 2018-11-19 PROCEDURE — 83516 IMMUNOASSAY NONANTIBODY: CPT

## 2018-11-19 PROCEDURE — 36415 COLL VENOUS BLD VENIPUNCTURE: CPT

## 2018-11-19 PROCEDURE — 80053 COMPREHEN METABOLIC PANEL: CPT

## 2018-11-19 PROCEDURE — 85025 COMPLETE CBC W/AUTO DIFF WBC: CPT

## 2018-11-21 LAB — TISSUE TRANSGLUTAMINASE IGA: 0 U/ML (ref 0–3)

## 2019-01-31 ENCOUNTER — HOSPITAL ENCOUNTER (OUTPATIENT)
Facility: HOSPITAL | Age: 29
Discharge: HOME | End: 2019-01-31

## 2019-01-31 PROCEDURE — 99001 SPECIMEN HANDLING PT-LAB: CPT

## 2020-01-17 ENCOUNTER — TRANSCRIBE ORDERS (OUTPATIENT)
Dept: ADMINISTRATIVE | Facility: HOSPITAL | Age: 30
End: 2020-01-17

## 2020-01-17 DIAGNOSIS — Z80.3 FAMILY HISTORY OF BREAST CANCER: ICD-10-CM

## 2020-01-17 DIAGNOSIS — N64.4 BREAST PAIN: Primary | ICD-10-CM

## 2020-02-11 ENCOUNTER — HOSPITAL ENCOUNTER (OUTPATIENT)
Dept: ULTRASOUND IMAGING | Facility: HOSPITAL | Age: 30
Discharge: HOME | End: 2020-02-11
Admitting: NURSE PRACTITIONER

## 2020-02-11 DIAGNOSIS — Z80.3 FAMILY HISTORY OF BREAST CANCER: ICD-10-CM

## 2020-02-11 DIAGNOSIS — N64.4 BREAST PAIN: ICD-10-CM

## 2020-02-11 PROCEDURE — 76642 ULTRASOUND BREAST LIMITED: CPT | Performed by: RADIOLOGY

## 2020-02-11 PROCEDURE — 76642 ULTRASOUND BREAST LIMITED: CPT

## 2020-02-19 ENCOUNTER — APPOINTMENT (OUTPATIENT)
Dept: LAB | Facility: HOSPITAL | Age: 30
End: 2020-02-19

## 2020-02-19 ENCOUNTER — CLINICAL SUPPORT (OUTPATIENT)
Dept: GENETICS | Facility: HOSPITAL | Age: 30
End: 2020-02-19

## 2020-02-19 DIAGNOSIS — Z80.3 FAMILY HISTORY OF BREAST CANCER: ICD-10-CM

## 2020-02-19 DIAGNOSIS — Z80.49 FAMILY HISTORY OF UTERINE CANCER: ICD-10-CM

## 2020-02-19 DIAGNOSIS — Z13.79 GENETIC TESTING: Primary | ICD-10-CM

## 2020-02-19 PROCEDURE — 96040: CPT | Performed by: GENETIC COUNSELOR, MS

## 2020-02-19 NOTE — PROGRESS NOTES
Arelis Taylor is a 29-year-old female who was seen for genetic counseling due to a family history of breast cancer. She was 14 years old at menarche and is nulliparous. She retains her uterus and ovaries. She does not have a personal history of cancer. Her current cancer screening includes annual clinical breast exam. Ms. Taylor was interested in discussing her risk for a hereditary cancer syndrome. Ms. Taylor was interested in pursuing a multi-gene panel, and therefore the CancerNext panel was ordered through [employer] which analyzes 34 genes associated with an increased cancer risk. The genes on this panel include APC, MARBELLA, BARD1, BMPR1A, BRCA1, BRCA2, BRIP1, CDH1, CDK4, CDKN2A, CHEK2, DICER1, EPCAM, GREM1, HOXB13, MLH1, MRE11A, MSH2, MSH6, MUTYH, NBN, NF1, PALB2, PMS2, POLD1, POLE, PTEN, RAD50, RAD51C, RAD51D, SMAD4, SMARCA4, STK11, and TP53. Results from this testing are expected in approximately 2-3 weeks.    FAMILY HISTORY (see attached pedigree):    Mother:  Triple negative breast cancer, 50  Mat. Grandmother: Uterine cancer, 49; Breast cancer, 53  Mat. Grandfather: Bone cancer, 60s  Pat. Grandfather: Lung cancer, 70s  Pat. Grandmother: Skin cancers (non-melanoma)    We do not have medical records regarding the diagnoses in Ms. Taylor’s family.    RISK ASSESSMENT: Ms. Taylor’s family history led to concern regarding a hereditary cancer syndrome. She clearly meets NCCN guidelines criteria for BRCA1/2 testing based on her family history of triple negative breast cancer diagnosed before age 60.  In cases where an affected relative is not available for testing or not willing to pursue testing, it is appropriate to offer testing to an unaffected individual. Ms. Taylor opted to pursue multigene panel testing via the CancerNext panel. If genetic testing is negative, Ms. Taylor’s management should be guided by family history. These risk assessments are based on the family history information provided at  the time of the appointment and could change in the future should new information be obtained.    GENETIC COUNSELING (30 minutes):  We reviewed the family history information in detail. Cases of cancer follow three general patterns: sporadic, familial, and hereditary.  While most cancer is sporadic, some cases appear to occur in family clusters.  These cases are said to be familial and account for 10-20% of cancer cases.  Familial cases may be due to a combination of shared genes and environmental factors among family members.  In even fewer families, the cancer is said to be inherited, and the genes responsible for the cancer are known.      Family histories typical of hereditary cancer syndromes usually include multiple first- and second-degree relatives diagnosed with cancer types that define a syndrome. These cases tend to be diagnosed at younger-than-expected ages and can be bilateral or multifocal. The cancer in these families follows an autosomal dominant inheritance pattern, which indicates the likely presence of a mutation in a cancer susceptibility gene. Children and siblings of an individual believed to carry this mutation have a 50% chance of inheriting that mutation, thereby inheriting the increased risk to develop cancer. These mutations can be passed down from the maternal or the paternal lineage.    Based on Ms. Taylor’s family history, we discussed that hereditary breast and ovarian cancer accounts for 5-10% of all cases.  A significant proportion of hereditary breast cancer can be attributed to mutations in the BRCA1 and BRCA2 genes.  Mutations in these genes confer an increased risk for breast cancer, ovarian cancer, male breast cancer, prostate cancer, and pancreatic cancer.  Women with a BRCA1 or BRCA2 mutation have up to an 87% lifetime risk of breast cancer and up to a 44% risk of ovarian cancer.    Some of the genes that will be evaluated on this multigene panel have well defined cancer  risks and established management guidelines.  Other genes that can be tested for have been more recently described, and there may be less data regarding the risks and therefore may not have established management guidelines.  We discussed these limitations at length. Based on Ms. Taylor’s family history of cancer and her desire to get more information regarding her personal risks she opted to pursue testing through a panel evaluating several other genes known to increase the risk for cancer.    GENETIC TESTING:  The risks, benefits and limitations of genetic testing and implications for clinical management following testing were reviewed. DNA test results can influence decisions regarding screening and prevention.  Genetic testing can have significant psychological implications for both individuals and families. Also discussed was the possibility of employment and insurance discrimination based on genetic test results and the federal and states laws that are in place to prevent this (ADITYA), as well as the limitations of these laws.         We discussed panel testing, which would involve testing 34 genes associated with increased cancer risk. The benefits and limitations of genetic testing were discussed. The implications of a positive or negative test result were discussed. We also discussed the importance of testing on an affected relative. We discussed the possibility that, in some cases, genetic test results may be ambiguous due to the identification of a genetic variant. These variants may or may not be associated with an increased cancer risk. With multigene panel testing, it is not uncommon for a variant of uncertain significance (VUS) to be identified.  If a VUS is identified, testing family members is not recommended and screening recommendations are made based on the family history. The laboratories that perform genetic testing work to reclassify the VUS and send out an amended report if and when a VUS is  reclassified.  The majority of variant findings are ultimately reclassified to a negative result. Given her family history, a negative test result does not eliminate all cancer risk, although the risk would not be as high as it would with positive genetic testing.     PLAN:  Genetic testing via the CancerNext panel through [facility] was ordered and results are expected in 2-3 weeks. We will contact Ms. Taylor with her results once they are received.      Louise Flores MS, [facility] [place] [facility]  Licensed Certified Genetic Counselor

## 2020-05-01 ENCOUNTER — DOCUMENTATION (OUTPATIENT)
Dept: GENETICS | Facility: HOSPITAL | Age: 30
End: 2020-05-01

## 2020-05-01 NOTE — PROGRESS NOTES
Arelis Taylor is a 29-year-old female who was seen for genetic counseling due to a family history of breast cancer. She was 14 years old at menarche and is nulliparous. She retains her uterus and ovaries. She does not have a personal history of cancer. Her current cancer screening includes annual clinical breast exam. Ms. Taylor was interested in discussing her risk for a hereditary cancer syndrome. Ms. Tayolr was interested in pursuing a multi-gene panel, and therefore the CancerNext panel was ordered through [employer] which analyzes 34 genes associated with an increased cancer risk. The genes on this panel include APC, MARBELLA, BARD1, BMPR1A, BRCA1, BRCA2, BRIP1, CDH1, CDK4, CDKN2A, CHEK2, DICER1, EPCAM, GREM1, HOXB13, MLH1, MRE11A, MSH2, MSH6, MUTYH, NBN, NF1, PALB2, PMS2, POLD1, POLE, PTEN, RAD50, RAD51C, RAD51D, SMAD4, SMARCA4, STK11, and TP53. Genetic testing was negative for pathogenic mutations in BRCA1/2 and 32 additional genes on the CancerNext panel. These normal results were discussed with Ms. Taylor by telephone on 5/1/2020.    FAMILY HISTORY (see attached pedigree):    Mother:  Triple negative breast cancer, 50  Mat. Grandmother: Uterine cancer, 49; Breast cancer, 53  Mat. Grandfather: Bone cancer, 60s  Pat. Grandfather: Lung cancer, 70s  Pat. Grandmother: Skin cancers (non-melanoma)    We do not have medical records regarding the diagnoses in Ms. Taylor’s family.    RISK ASSESSMENT: Ms. Taylor’s family history led to concern regarding a hereditary cancer syndrome. She clearly meets NCCN guidelines criteria for BRCA1/2 testing based on her family history of triple negative breast cancer diagnosed before age 60.  In cases where an affected relative is not available for testing or not willing to pursue testing, it is appropriate to offer testing to an unaffected individual. Ms. Taylor opted to pursue multigene panel testing via the CancerNext panel. If genetic testing is negative, Ms. Taylor’s  management should be guided by family history.     Because genetic testing was negative, we discussed that her lifetime risk of developing breast cancer should be assessed using family history-based risk assessment models. Using these models, Ms. Taylor’ breast cancer risk is estimated to be up to 26%, in comparison to the general population lifetime risk of 12.5% (YESENIA/Zonia).  A risk greater than 20% warrants consideration of increased screening per NCCN guidelines.  These risk assessments are based on the family history information provided at the time of the appointment and could change in the future should new information be obtained.     GENETIC COUNSELING:  We reviewed the family history information in detail. Cases of cancer follow three general patterns: sporadic, familial, and hereditary.  While most cancer is sporadic, some cases appear to occur in family clusters.  These cases are said to be familial and account for 10-20% of cancer cases.  Familial cases may be due to a combination of shared genes and environmental factors among family members.  In even fewer families, the cancer is said to be inherited, and the genes responsible for the cancer are known.      Family histories typical of hereditary cancer syndromes usually include multiple first- and second-degree relatives diagnosed with cancer types that define a syndrome. These cases tend to be diagnosed at younger-than-expected ages and can be bilateral or multifocal. The cancer in these families follows an autosomal dominant inheritance pattern, which indicates the likely presence of a mutation in a cancer susceptibility gene. Children and siblings of an individual believed to carry this mutation have a 50% chance of inheriting that mutation, thereby inheriting the increased risk to develop cancer. These mutations can be passed down from the maternal or the paternal lineage.    Based on Ms. Taylor’s family history, we discussed that  hereditary breast and ovarian cancer accounts for 5-10% of all cases.  A significant proportion of hereditary breast cancer can be attributed to mutations in the BRCA1 and BRCA2 genes.  Mutations in these genes confer an increased risk for breast cancer, ovarian cancer, male breast cancer, prostate cancer, and pancreatic cancer.  Women with a BRCA1 or BRCA2 mutation have up to an 87% lifetime risk of breast cancer and up to a 44% risk of ovarian cancer.    Some of the genes that will be evaluated on this multigene panel have well defined cancer risks and established management guidelines.  Other genes that can be tested for have been more recently described, and there may be less data regarding the risks and therefore may not have established management guidelines.  We discussed these limitations at length. Based on Ms. Taylor’s family history of cancer and her desire to get more information regarding her personal risks she opted to pursue testing through a panel evaluating several other genes known to increase the risk for cancer.    GENETIC TESTING:  The risks, benefits and limitations of genetic testing and implications for clinical management following testing were reviewed. DNA test results can influence decisions regarding screening and prevention.  Genetic testing can have significant psychological implications for both individuals and families. Also discussed was the possibility of employment and insurance discrimination based on genetic test results and the federal and states laws that are in place to prevent this (ADITYA), as well as the limitations of these laws.         We discussed panel testing, which would involve testing 34 genes associated with increased cancer risk. The benefits and limitations of genetic testing were discussed. The implications of a positive or negative test result were discussed. We also discussed the importance of testing on an affected relative. We discussed the possibility  that, in some cases, genetic test results may be ambiguous due to the identification of a genetic variant. These variants may or may not be associated with an increased cancer risk. With multigene panel testing, it is not uncommon for a variant of uncertain significance (VUS) to be identified.  If a VUS is identified, testing family members is not recommended and screening recommendations are made based on the family history. The laboratories that perform genetic testing work to reclassify the VUS and send out an amended report if and when a VUS is reclassified.  The majority of variant findings are ultimately reclassified to a negative result. Given her family history, a negative test result does not eliminate all cancer risk, although the risk would not be as high as it would with positive genetic testing.     TEST RESULTS: Genetic testing was negative for known pathogenic mutations by sequencing, rearrangement testing, and RNA analysis for the 34 genes on the CancerNext panel.  This negative result greatly lowers, but does not eliminate, the risk of a hereditary cancer syndrome for Ms. Taylor. It is possible that the family history is due to a hereditary cancer syndrome that Ms. Taylor did not happen to inherit. If a relative of Ms. Taylor were to have testing in the future and was found to carry a mutation in a gene included on this panel, Ms. Taylor’ risk assessment would need to be updated. This assessment is based on the information provided at the time of the consultation.    CLINICAL MANAGEMENT GUIDELINES: Despite the negative genetic test results, Ms. Taylor’ lifetime risk for breast cancer is estimated to be above 20% based on her family history. Given Ms. Taylor’s increased risk, options available to individuals with a high lifetime risk for breast cancer were discussed, including increased surveillance and chemoprevention.    Increased surveillance, based on NCCN guidelines, would consist of  semi-annual clinical breast exams and monthly self-breast exams starting by age 18 and annual mammography starting 10 years younger than the earliest diagnosis in the family, or by age 40, whichever is earliest. According to an American Cancer Society expert panel and NCCN guidelines, annual breast MRI should be offered to women whose lifetime risk of breast cancer is 20-25 percent or more, typically beginning at the same age as mammography.  Breast cancer chemoprevention is another option that can be considered.  Studies have shown that Tamoxifen and Raloxifene can cut the risk of estrogen receptor positive breast cancer by up to 50% when taken by high-risk women over a 5-year period.  These are not recommended before age 35.  There are risks and side effects associated with these medications; therefore, the risks versus benefits must be considered prior to deciding to take chemopreventative medications. These assessments are based on the information provided at the time of consultation and could change in the future should new information be obtained.    PLAN:  Genetic counseling remains available to Ms. Talyor and her family. If Ms. Taylor has any questions or concerns, she is welcome to contact us at [phone].      Louise Flores MS, [facility] [place], [facility]  Licensed Certified Genetic Counselor       Cc: Arelis Mcclain MD

## 2021-12-06 ENCOUNTER — HOSPITAL ENCOUNTER (OUTPATIENT)
Dept: PHYSICAL THERAPY | Facility: HOSPITAL | Age: 31
Setting detail: THERAPIES SERIES
Discharge: HOME | End: 2021-12-06
Payer: COMMERCIAL

## 2021-12-06 PROCEDURE — 97110 THERAPEUTIC EXERCISES: CPT

## 2021-12-06 PROCEDURE — 97161 PT EVAL LOW COMPLEX 20 MIN: CPT

## 2021-12-06 ASSESSMENT — PAIN DESCRIPTION - LOCATION: LOCATION: SHOULDER

## 2021-12-06 ASSESSMENT — PAIN SCALES - GENERAL: PAINLEVEL_OUTOF10: 1

## 2021-12-06 ASSESSMENT — PAIN DESCRIPTION - PAIN TYPE: TYPE: CHRONIC PAIN

## 2021-12-06 ASSESSMENT — PAIN DESCRIPTION - ORIENTATION: ORIENTATION: RIGHT

## 2021-12-06 NOTE — PROGRESS NOTES
Physical Therapy  Initial Assessment  Date: 2021  Patient Name: Vince Rivera  MRN: [id number]  : 1990     Treatment Diagnosis: Right shoulder pain; muscle weakness; s/s conistent with tendinitis, strain/sprain; mild ulnar nerve irritation    Restrictions  Restrictions/Precautions  Restrictions/Precautions: General Precautions  Required Braces or Orthoses?: No    Subjective   General  Chart Reviewed: Yes  Patient assessed for rehabilitation services?: Yes  Response To Previous Treatment: Not applicable  Family / Caregiver Present: No  Referring Practitioner: Dallin Phillips MD  Diagnosis: Right shoulder pain, tendinitis  Follows Commands: Within Functional Limits  PT Visit Information  PT Insurance Information: BCBS  Subjective  Subjective: Patient reports: she developed right shoulder and elbow pain + forearm, 4th, 5th digit paresthesia following a fall; notes she fell and landed on her left elbow and \"jammed\" her shoulder in the process; x-rays were (-) for fracture, MRI did not show any tears; complaints consist of: 1) localized anterior shoulder pain, intermittent, but daily in nature, worse with reaching, lifting, and overhead movements, causes sleep disturbance at times; 2) right medial/posterior elbow pain with occasional paresthesia in ulnar nerve distribution; she is right handed; played competitive softball in high school and college; 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   Pain Screening  Patient Currently in Pain: Yes  Pain Assessment  Pain Assessment: 0-10  Pain Level: 1  Pain Type: Chronic pain  Pain Location: Shoulder  Pain Orientation: Right  Pain Descriptors: Sore; Tender; Aching  Pain Frequency: Intermittent  Pain Onset: Sudden  Vital Signs  Patient Currently in Pain: Yes    Vision/Hearing  Vision  Vision: Within Functional Limits  Hearing  Hearing: Within functional limits    Orientation  Orientation  Overall Orientation Status: Within Normal Limits    Social/Functional History  Social/Functional History  Ambulation Assistance: Independent  Transfer Assistance: Independent  Active : Yes  Mode of Transportation: Car  Occupation: Full time employment  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, light physical activity    Objective     Observation/Palpation  Posture: Fair  Palpation: (+) point tenderness anterior right shoulder- supraspinatus insertion, proximal biceps, tender over cubital tunnel  Observation: moderate rounded shoulder with abd scapula; GH, SC, AC joints are anatomically intact, no obvious edema or visual abnormalities    AROM RUE (degrees)  RUE AROM : WNL    Strength RUE  R Shoulder Flexion: 4+/5  R Shoulder Extension: 5/5  R Shoulder ABduction: 4/5  R Shoulder Internal Rotation: 4+/5  R Shoulder External Rotation: 4+/5  R Elbow Flexion: 5/5  R Elbow Extension: 5/5  R Forearm Pron: 4+/5  R Forearm Sup: 4+/5  R Wrist Flexion: 5/5  R Wrist Extension: 5/5  Strength Other  Other: Right MT, LT = 4-/5  Tone RLE  RLE Tone: Normotonic  Tone LLE  LLE Tone: Normotonic  Motor Control  Gross Motor?: WFL  Additional Measures  Special Tests: (-) drop arm, (+) impingement, (-) labral test; (+) Tinel's at right cubital tunnel, (+) Speed's  Other: Quick DASH = 34  Sensation  Overall Sensation Status: WFL                   Exercises  Exercise 1: scap squeezes - 3 x 15  Exercise 2: right shoulder neutral isometrics: ext, IR, ER - 5\" x 10  Exercise 3: IR, ER AROM - 3 x 15  Exercise 4: Prone right shoulder ext with scap squeeze - 3 x 15  Exercise 5: prone right shoulder retraction - 3 x 15  Exercise 6: Doorway chest stretch - bilateral or right side only - 10\" x 5  Exercise 7: Pendulum: cw, ccw - 30\" x 4  Exercise 8: Begin Pulleys: 2' x 2  Exercise 9: Begin gentle wall push ups - 3 x 10  Exercise 10: Manual: Right shoulder: grade 1-3 mobs, PROM, STM anterior deltoid, proximal biceps  Exercise 11:  E-stim, ice PRN for pain, inflammation Assessment   Conditions Requiring Skilled Therapeutic Intervention  Body structures, Functions, Activity limitations: Decreased strength; Decreased high-level IADLs; Increased pain  Assessment: Right shoulder pain; muscle weakness; s/s conistent with tendinitis, strain/sprain; mild ulnar nerve irritation; will benefit from PT to help resolve these deficits, and return to pre-injury functional capacity. Treatment Diagnosis: Right shoulder pain; muscle weakness; s/s conistent with tendinitis, strain/sprain; mild ulnar nerve irritation  Prognosis: Excellent  Decision Making: Low Complexity  REQUIRES PT FOLLOW UP: Yes  Treatment Initiated : Evaluation, therex, HEP, condition and self-care education  Activity Tolerance  Activity Tolerance: Patient Tolerated treatment well         Plan   Plan  Times per week: 2 x week  Plan weeks: 4-6 weeks  Current Treatment Recommendations: Strengthening, ROM, Home Exercise Program, Neuromuscular Re-education, Manual Therapy - Soft Tissue Mobilization, Manual Therapy - Joint Manipulation, Modalities, ADL/Self-care Training               Goals  Short term goals  Time Frame for Short term goals: 2 weeks  Short term goal 1: Patient to be independent with HEP. Short term goal 2: Patient to report a 25% decrease in right shoulder pain with ADL's and I-ADL's. Long term goals  Time Frame for Long term goals : 6 weeks  Long term goal 1: Patient to report a % decrease in right shoulder pain with routine daily activities. Long term goal 2: Achieve 5-/5 right shoulder strength in MMG's and MT and LT. Long term goal 3: Patient to achieve a Quick DASH score of 5 or less, returning to pre-injury functional level with ADL's and I-ADL's. Long term goal 4: Initiate throwing rehab program and return to recreation-sport activities.        Therapy Time   Individual Concurrent Group Co-treatment   Time In           Time Out           Minutes                   Anamaria Malagon, PT Certification of Medical Necessity: It will be understood that this treatment plan is certified medically necessary by the documenting therapist and referring physician mentioned in this report. Unless the physician indicated otherwise through written correspondence with our office, all further referrals will act as certification of medical necessity on this treatment plan. Thank you for this referral.  If you have questions regarding this plan of care, please call [phone].           Revisions to this plan (optional):                     Please sign and return this plan to:   FAX: [phone]      Signature:                                 Date:

## 2021-12-07 ASSESSMENT — PAIN DESCRIPTION - FREQUENCY: FREQUENCY: INTERMITTENT

## 2021-12-07 ASSESSMENT — PAIN DESCRIPTION - ORIENTATION: ORIENTATION: RIGHT

## 2021-12-07 ASSESSMENT — PAIN DESCRIPTION - DESCRIPTORS: DESCRIPTORS: SORE;TENDER;ACHING

## 2021-12-07 ASSESSMENT — PAIN DESCRIPTION - LOCATION: LOCATION: SHOULDER

## 2021-12-07 ASSESSMENT — PAIN DESCRIPTION - ONSET: ONSET: SUDDEN

## 2021-12-07 ASSESSMENT — PAIN DESCRIPTION - PAIN TYPE: TYPE: CHRONIC PAIN

## 2021-12-07 ASSESSMENT — PAIN SCALES - GENERAL: PAINLEVEL_OUTOF10: 1

## 2021-12-10 ENCOUNTER — HOSPITAL ENCOUNTER (OUTPATIENT)
Dept: PHYSICAL THERAPY | Facility: HOSPITAL | Age: 31
Setting detail: THERAPIES SERIES
Discharge: HOME | End: 2021-12-10
Payer: COMMERCIAL

## 2021-12-10 PROCEDURE — 97140 MANUAL THERAPY 1/> REGIONS: CPT

## 2021-12-10 PROCEDURE — 97110 THERAPEUTIC EXERCISES: CPT

## 2021-12-10 NOTE — FLOWSHEET NOTE
Physical Therapy Daily Treatment Note   Date:  12/10/2021    TIme In:     0908                 Time Out:   1003    Patient Name:  Cinthia Willis    :  1990  MRN: [id number]    Restrictions/Precautions:    Pertinent Medical History:  Medical/Treatment Diagnosis Information:  ·   Right shoulder pain; muscle weakness; s/s conistent with tendinitis, strain/sprain; mild ulnar nerve irritation  ·    Insurance/Certification information:    [employer]BS  Physician Information:    Nina Wheatley MD  Plan of care signed (Y/N):    Visit# / total visits:    2 /    G-Code (if applicable):      Date / Visit # G-Code Applied:         Progress Note: []  Yes  [x]  No  Next due by: Visit #10      Pain level: 1  /10    Subjective:  No changes or new c/o.      Objective:   Observation:    Test measurements:     Palpation:    Exercises:  Exercise Resistance/Repetitions Other comments   scap squeezes 3 x 15 10   Right shoulder neutral isometrics: ext, IR, ER 5\" x 10 10   AROM IR, ER 3 x 15 10   Gentle wall push-ups 3 x 10 10   Pulleys 2' x 2 10   Doorway chest stretch 10\" x 5 10   Prone right shoulder extension with scap squeeze 3 x 15 10   Prone right shoulder retraction 3 x 15 10   pendulum 30\" x 4 10                              Other Therapeutic Activities:      Manual Treatments:   Manual: Right shoulder: grade 1-3 mobs, PROM, STM anterior deltoid, proximal biceps    Modalities:        Timed Code Treatment Minutes:  48'      Total Treatment Minutes:  54'    Treatment/Activity Tolerance:  [x] Patient tolerated treatment well [] Patient limited by fatigue  [] Patient limited by pain  [] Patient limited by other medical complications  [] Other:     Pain after treatment:     4 /10    Prognosis: [x] Good [] Fair  [] Poor    Patient Requires Follow-up: [x] Yes  [] No    Plan:   [x] Continue per plan of care [] Alter current plan (see comments)  [] Plan of care initiated [] Hold pending MD visit [] Discharge    Plan for Next Session:        Electronically signed by:  Toshia Loera, PT

## 2021-12-17 ENCOUNTER — HOSPITAL ENCOUNTER (OUTPATIENT)
Dept: PHYSICAL THERAPY | Facility: HOSPITAL | Age: 31
Setting detail: THERAPIES SERIES
Discharge: HOME | End: 2021-12-17
Payer: COMMERCIAL

## 2021-12-17 PROCEDURE — 97110 THERAPEUTIC EXERCISES: CPT

## 2021-12-17 PROCEDURE — 97140 MANUAL THERAPY 1/> REGIONS: CPT

## 2021-12-17 NOTE — FLOWSHEET NOTE
Physical Therapy Daily Treatment Note   Date:  2021    TIme In:     0900                 Time Out:   1000    Patient Name:  Bobetta Goodell    :  1990  MRN: [id number]    Restrictions/Precautions:    Pertinent Medical History:  Medical/Treatment Diagnosis Information:  ·   Right shoulder pain; muscle weakness; s/s conistent with tendinitis, strain/sprain; mild ulnar nerve irritation     Insurance/Certification information:    [facility]  Physician Information:    Addis Henderson MD  Plan of care signed (Y/N):    Visit# / total visits:    4 /    G-Code (if applicable):      Date / Visit # G-Code Applied:         Progress Note: []  Yes  [x]  No  Next due by: Visit #10      Pain level: 1  /10    Subjective:  No changes or new c/o; feeling better overall.      Objective:   Observation:    Test measurements:     Palpation:    Exercises:  Exercise Resistance/Repetitions Other comments   scap squeezes 3 x 15 17   Right shoulder neutral isometrics: ext, IR, ER 5\" x 10 17   AROM IR, ER 3 x 15 17   Gentle wall push-ups 3 x 10 17   Pulleys 2' x 2 17   Doorway chest stretch 10\" x 5 17   Prone right shoulder extension with scap squeeze 3 x 15 17   Prone right shoulder retraction 3 x 15 17   pendulum 30\" x 4 17        Mid rows BTB - 3 x 10 17   Low rows BTB - 3 x 10 17               Other Therapeutic Activities:      Manual Treatments:   Manual: Right shoulder: grade 1-3 mobs, PROM, STM anterior deltoid, proximal biceps    Modalities:        Timed Code Treatment Minutes:  48'      Total Treatment Minutes:  61'    Treatment/Activity Tolerance:  [x] Patient tolerated treatment well [] Patient limited by fatigue  [] Patient limited by pain  [] Patient limited by other medical complications  [] Other:     Pain after treatment:     1 /10    Prognosis: [x] Good [] Fair  [] Poor    Patient Requires Follow-up: [x] Yes  [] No    Plan:   [x] Continue per plan of care [] Alter current plan (see comments)  [] Plan of care initiated [] Hold pending MD visit [] Discharge    Plan for Next Session:        Electronically signed by:  Darrall Goltz, PT

## 2021-12-20 ENCOUNTER — HOSPITAL ENCOUNTER (OUTPATIENT)
Dept: PHYSICAL THERAPY | Facility: HOSPITAL | Age: 31
Setting detail: THERAPIES SERIES
Discharge: HOME | End: 2021-12-20
Payer: COMMERCIAL

## 2021-12-20 PROCEDURE — 97140 MANUAL THERAPY 1/> REGIONS: CPT

## 2021-12-20 PROCEDURE — 97110 THERAPEUTIC EXERCISES: CPT

## 2021-12-20 NOTE — FLOWSHEET NOTE
Physical Therapy Daily Treatment Note   Date:  2021    TIme In:   830                   Time Out:  932     Patient Name:  Bobetta Goodell    :  1990  MRN: [id number]    Restrictions/Precautions:    Pertinent Medical History:  Medical/Treatment Diagnosis Information:  ·   Right shoulder pain; muscle weakness; s/s conistent with tendinitis, strain/sprain; mild ulnar nerve irritation     Insurance/Certification information:    [facility]  Physician Information:    Addis Henderson MD  Plan of care signed (Y/N):    Visit# / total visits:    5/    G-Code (if applicable):      Date / Visit # G-Code Applied:         Progress Note: []  Yes  [x]  No  Next due by: Visit #10      Pain level:  0/10    Subjective:   Pt reports her sh is doing better but feels like it is pinching when she raises it up over head. Objective:   Observation:    Test measurements:     Palpation:    Exercises:  Exercise Resistance/Repetitions Other comments   scap squeezes 3 x 15 20   Right shoulder neutral isometrics: ext, IR, ER 5\" x 10 20   AROM IR, ER 3 x 15 20   Gentle wall push-ups 3 x 10 20   Pulleys 2' x 2 20   Doorway chest stretch 10\" x 5 20   Prone right shoulder extension with scap squeeze 3 x 15 20   Prone right shoulder retraction 3 x 15 20   pendulum 30\" x 4 20        Mid rows BTB - 3 x 10 20   Low rows BTB - 3 x 10 20               Other Therapeutic Activities:      Manual Treatments:  Right shoulder: grade 1-3 mobs, PROM, STM anterior deltoid, proximal biceps x15'    Modalities:        Timed Code Treatment Minutes:  55      Total Treatment Minutes:  62    Treatment/Activity Tolerance:  [x] Patient tolerated treatment well [] Patient limited by fatigue  [] Patient limited by pain  [] Patient limited by other medical complications  [x] Other: Pt completed tx with no pain and mild soreness. Pt had no tenderness with manual tx.     Pain after treatment:     0/10 \"just sore\"    Prognosis: [x] Good [] Fair  [] Poor    Patient Requires Follow-up: [x] Yes  [] No    Plan:   [x] Continue per plan of care [] Alter current plan (see comments)  [] Plan of care initiated [] Hold pending MD visit [] Discharge    Plan for Next Session:        Electronically signed by:  Geri Copeland PTA

## 2021-12-23 ENCOUNTER — HOSPITAL ENCOUNTER (OUTPATIENT)
Dept: PHYSICAL THERAPY | Facility: HOSPITAL | Age: 31
Setting detail: THERAPIES SERIES
Discharge: HOME | End: 2021-12-23
Payer: COMMERCIAL

## 2021-12-23 PROCEDURE — 97140 MANUAL THERAPY 1/> REGIONS: CPT

## 2021-12-23 PROCEDURE — 97110 THERAPEUTIC EXERCISES: CPT

## 2021-12-23 NOTE — FLOWSHEET NOTE
Physical Therapy Daily Treatment Note   Date:  2021    TIme In:      1005                Time Out:   1109    Patient Name:  Luzma Mcclelland    :  1990  MRN: [id number]    Restrictions/Precautions:    Pertinent Medical History:  Medical/Treatment Diagnosis Information:  ·   Right shoulder pain; muscle weakness; s/s conistent with tendinitis, strain/sprain; mild ulnar nerve irritation     Insurance/Certification information:    [facility]  Physician Information:    Harmony Hodges MD  Plan of care signed (Y/N):    Visit# / total visits:    6 /    G-Code (if applicable):      Date / Visit # G-Code Applied:         Progress Note: []  Yes  [x]  No  Next due by: Visit #10      Pain level:  0/10    Subjective:   Pt reports:  Continues to feel better, no new c/o. Objective:   Observation:    Test measurements:     Palpation:    Exercises:  Exercise Resistance/Repetitions Other comments   scap squeezes 3 x 15 23   Right shoulder neutral isometrics: ext, IR, ER 5\" x 10 23   AROM IR, ER 3 x 15 23   Gentle wall push-ups 3 x 10 23   Pulleys 2' x 2 23   Doorway chest stretch 10\" x 5 23   Prone right shoulder extension with scap squeeze 3 x 15 - use 3# 23   Prone right shoulder retraction 3 x 15 - 3# 23   pendulum 30\" x 4 23        Mid rows BTB - 3 x 10 23   Low rows BTB - 3 x 10 23               Other Therapeutic Activities:      Manual Treatments:  Right shoulder: grade 1-3 mobs, PROM, STM anterior deltoid, proximal biceps x15'    Modalities: Ice pack x 10'      Timed Code Treatment Minutes:  48'      Total Treatment Minutes:  64    Treatment/Activity Tolerance:  [x] Patient tolerated treatment well [] Patient limited by fatigue  [] Patient limited by pain  [] Patient limited by other medical complications  [x] Other: Pt completed tx with no pain and mild soreness. Pt had no tenderness with manual tx.     Pain after treatment:     0/10 \"just sore\"    Prognosis: [x] Good [] Fair  [] Poor    Patient Requires Follow-up: [x] Yes  [] No    Plan:   [x] Continue per plan of care [] Alter current plan (see comments)  [] Plan of care initiated [] Hold pending MD visit [] Discharge    Plan for Next Session:        Electronically signed by:  Daniel Bae PT Donor Site Anesthesia Type: same as repair anesthesia

## 2021-12-27 ENCOUNTER — HOSPITAL ENCOUNTER (OUTPATIENT)
Dept: PHYSICAL THERAPY | Facility: HOSPITAL | Age: 31
Setting detail: THERAPIES SERIES
Discharge: HOME | End: 2021-12-27
Payer: COMMERCIAL

## 2021-12-27 PROCEDURE — 97140 MANUAL THERAPY 1/> REGIONS: CPT

## 2021-12-27 PROCEDURE — 97110 THERAPEUTIC EXERCISES: CPT

## 2021-12-27 NOTE — FLOWSHEET NOTE
Physical Therapy Daily Treatment Note   Date:  2021    TIme In:   930                   Time Out:   1026    Patient Name:  Fawn Nguyen    :  1990  MRN: [id number]    Restrictions/Precautions:    Pertinent Medical History:  Medical/Treatment Diagnosis Information:  ·   Right shoulder pain; muscle weakness; s/s conistent with tendinitis, strain/sprain; mild ulnar nerve irritation     Insurance/Certification information:    [employer]  Physician Information:    Estephania Franks MD  Plan of care signed (Y/N):    Visit# / total visits:    7/    G-Code (if applicable):      Date / Visit # G-Code Applied:         Progress Note: []  Yes  [x]  No  Next due by: Visit #10      Pain level:  2-3/10    Subjective:   Pt reports her sh is just a little sore this morning but she had a busy weekend. Objective:   Observation:    Test measurements:     Palpation:    Exercises:  Exercise Resistance/Repetitions Other comments   scap squeezes 3 x 15 27   Right shoulder neutral isometrics: ext, IR, ER 5\" x 10 27   AROM IR, ER 3 x 15 27   Gentle wall push-ups 3 x 10 27   Pulleys 2' x 2 27   Doorway chest stretch 10\" x 5 27   Prone right shoulder extension with scap squeeze 3 x 15 - use 3# 27   Prone right shoulder retraction 3 x 15 - 3# 27   pendulum 30\" x 4 27   Mid rows BTB - 3 x 10 27   Low rows BTB - 3 x 10 27                         Other Therapeutic Activities:      Manual Treatments:  Right shoulder: grade 1-3 mobs, PROM, STM anterior deltoid, proximal biceps x15'    Modalities: Ice pack x 10'      Timed Code Treatment Minutes:  45      Total Treatment Minutes:  56    Treatment/Activity Tolerance:  [x] Patient tolerated treatment well [] Patient limited by fatigue  [] Patient limited by pain  [] Patient limited by other medical complications  [x] Other: Pt completed tx with no pain and mild tenderness along right acromion process and glenoid tuberosity.     Pain after treatment:     0/10 \"numb\"    Prognosis: [x] Good [] Fair  [] Poor    Patient Requires Follow-up: [x] Yes  [] No    Plan:   [x] Continue per plan of care [] Alter current plan (see comments)  [] Plan of care initiated [] Hold pending MD visit [] Discharge    Plan for Next Session:        Electronically signed by:  Ian Copeland PTA

## 2024-02-17 NOTE — DISCHARGE INSTRUCTIONS
No pushing, pulling, tugging,  heavy lifting, or strenuous activity.  No major decision making, driving, or drinking alcoholic beverages for 24 hours. ( due to the medications you have  received)  Always use good hand hygiene/washing techniques.  NO driving while taking pain medications.    To assist you in voiding:  Drink plenty of fluids  Listen to running water while attempting to void.    If you are unable to urinate and you have an uncomfortable urge to void or it has been   6 hours since you were discharged, return to the Emergency Room             Medications

## 2024-03-18 ENCOUNTER — HOSPITAL ENCOUNTER (OUTPATIENT)
Facility: HOSPITAL | Age: 34
Discharge: HOME | End: 2024-03-18

## 2024-03-18 PROCEDURE — 99001 SPECIMEN HANDLING PT-LAB: CPT

## 2025-02-21 ENCOUNTER — HOSPITAL ENCOUNTER (OUTPATIENT)
Facility: HOSPITAL | Age: 35
Discharge: HOME | End: 2025-02-21

## (undated) DEVICE — JELLY,LUBE,STERILE,FLIP TOP,TUBE,2-OZ: Brand: MEDLINE

## (undated) DEVICE — SYR LUER SLPTP 50ML

## (undated) DEVICE — MEDI-VAC NON-CONDUCTIVE SUCTION TUBING: Brand: CARDINAL HEALTH

## (undated) DEVICE — ENDOGATOR AUXILIARY WATER JET CONNECTOR: Brand: ENDOGATOR

## (undated) DEVICE — CONMED SCOPE SAVER BITE BLOCK, 20X27 MM: Brand: SCOPE SAVER

## (undated) DEVICE — 2000CC GUARDIAN II: Brand: GUARDIAN